# Patient Record
Sex: MALE | URBAN - METROPOLITAN AREA
[De-identification: names, ages, dates, MRNs, and addresses within clinical notes are randomized per-mention and may not be internally consistent; named-entity substitution may affect disease eponyms.]

---

## 2018-09-12 ENCOUNTER — OFFICE VISIT (OUTPATIENT)
Dept: URBAN - METROPOLITAN AREA CLINIC 23 | Facility: CLINIC | Age: 78
End: 2018-09-12
Payer: MEDICARE

## 2018-09-12 DIAGNOSIS — E11.9 TYPE 2 DIABETES MELLITUS W/O COMPLICATION: Primary | ICD-10-CM

## 2018-09-12 PROCEDURE — 92134 CPTRZ OPH DX IMG PST SGM RTA: CPT | Performed by: OPTOMETRIST

## 2018-09-12 PROCEDURE — 92014 COMPRE OPH EXAM EST PT 1/>: CPT | Performed by: OPTOMETRIST

## 2018-09-12 ASSESSMENT — INTRAOCULAR PRESSURE
OD: 15
OS: 15

## 2018-09-12 NOTE — IMPRESSION/PLAN
Impression: Type 2 diabetes mellitus w/o complication: N33.3. Plan: Discussed diagnosis in detail with patient. Advised and emphasized patient of blood sugar control. Discussed risks of progression. Poor compliance can lead to blindness. OCT macula performed and reviewed with patient today- no signs of diabetic retinopathy. Reassured patient of condition and treatment. Will continue to observe condition and or symptoms.

## 2020-02-12 ENCOUNTER — OFFICE VISIT (OUTPATIENT)
Dept: URBAN - METROPOLITAN AREA CLINIC 23 | Facility: CLINIC | Age: 80
End: 2020-02-12
Payer: MEDICARE

## 2020-02-12 DIAGNOSIS — H26.493 OTHER SECONDARY CATARACT, BILATERAL: ICD-10-CM

## 2020-02-12 PROCEDURE — 92014 COMPRE OPH EXAM EST PT 1/>: CPT | Performed by: OPTOMETRIST

## 2020-02-12 ASSESSMENT — INTRAOCULAR PRESSURE
OS: 17
OD: 17

## 2020-02-12 NOTE — IMPRESSION/PLAN
Impression: Other secondary cataract, bilateral: H26.493. Plan: Discussed findings. No tx recommended at this time. Monitor. Pt to call if VA worsens.

## 2020-02-12 NOTE — IMPRESSION/PLAN
Impression: Type 2 diabetes mellitus w/o complication: S70.6. Plan: Diabetes type II: no background retinopathy, no signs of neovascularization noted. Discussed ocular and systemic benefits of blood sugar control. Optos done. Stable Monitor yearly.

## 2021-04-23 ENCOUNTER — OFFICE VISIT (OUTPATIENT)
Dept: URBAN - METROPOLITAN AREA CLINIC 25 | Facility: CLINIC | Age: 81
End: 2021-04-23
Payer: MEDICARE

## 2021-04-23 DIAGNOSIS — H43.812 VITREOUS DEGENERATION, LEFT EYE: ICD-10-CM

## 2021-04-23 DIAGNOSIS — H52.223 REGULAR ASTIGMATISM, BILATERAL: ICD-10-CM

## 2021-04-23 DIAGNOSIS — H52.4 PRESBYOPIA: ICD-10-CM

## 2021-04-23 PROCEDURE — 99214 OFFICE O/P EST MOD 30 MIN: CPT | Performed by: OPTOMETRIST

## 2021-04-23 ASSESSMENT — INTRAOCULAR PRESSURE
OS: 19
OD: 15

## 2021-04-23 ASSESSMENT — VISUAL ACUITY
OD: 20/20
OS: 20/25

## 2021-04-23 NOTE — IMPRESSION/PLAN
Impression: Type 2 diabetes mellitus w/o complication: T75.7. Plan: Diabetes type II: no background retinopathy, no signs of neovascularization noted. Discussed ocular and systemic benefits of blood sugar control.